# Patient Record
Sex: MALE | Race: WHITE | NOT HISPANIC OR LATINO | Employment: OTHER | URBAN - METROPOLITAN AREA
[De-identification: names, ages, dates, MRNs, and addresses within clinical notes are randomized per-mention and may not be internally consistent; named-entity substitution may affect disease eponyms.]

---

## 2019-05-06 LAB
ABS NRBC COUNT: 0 X 10 3/UL (ref 0–0.01)
ABSOLUTE BASOPHIL: 0.05 X 10 3/UL (ref 0–0.22)
ABSOLUTE EOSINOPHIL: 0.27 X 10 3/UL (ref 0.04–0.54)
ABSOLUTE IMMATURE GRAN: 0.04 X 10 3/UL (ref 0–0.04)
ABSOLUTE LYMPHOCYTE: 1.25 X 10 3/UL (ref 0.86–4.75)
ABSOLUTE MONOCYTE: 0.81 X 10 3/UL (ref 0.22–1.08)
ALBUMIN SERPL-MCNC: 4.5 G/DL (ref 3.5–5.2)
ALBUMIN/GLOB SERPL ELPH: 1.6 {RATIO} (ref 1–2.7)
ALP ISOS SERPL LEV INH-CCNC: 57 IU/L (ref 40–130)
ALT (SGPT): 28 U/L (ref 0–41)
AMORPH URATE CRY URNS QL MICRO: NEGATIVE
ANION GAP SERPL CALC-SCNC: 10 MMOL/L (ref 8–17)
AST SERPL-CCNC: 24 U/L (ref 0–40)
BACTERIA #/AREA URNS HPF: ABNORMAL /[HPF]
BASOPHILS NFR BLD: 0.6 %
BILIRUB UR QL STRIP: NEGATIVE
BILIRUBIN, TOTAL: 0.93 MG/DL (ref 0–1.2)
BUN/CREAT SERPL: 9.9 (ref 6–20)
CALCIUM SERPL-MCNC: 9.8 MG/DL (ref 8.6–10.2)
CARBON DIOXIDE, CO2: 28 MMOL/L (ref 22–29)
CHLORIDE: 101 MMOL/L (ref 98–107)
CLARITY UR: CLEAR
COLOR UR: YELLOW
CREAT SERPL-MCNC: 0.88 MG/DL (ref 0.7–1.2)
EOSINOPHIL NFR BLD: 3.2 %
EPITHELIAL CELLS: ABNORMAL
ESTIMATED AVERAGE GLUCOSE: 142 MG/DL
GFR ESTIMATION: 85.87
GLOBULIN: 2.9 G/DL (ref 1.5–4.5)
GLUCOSE (UA): NEGATIVE MG/DL
GLUCOSE: 181 MG/DL (ref 82–115)
HBA1C MFR BLD: 6.6 % (ref 4–6)
HCT VFR BLD AUTO: 49.4 % (ref 42–52)
HGB BLD-MCNC: 16.3 G/DL (ref 14–18)
IMMATURE GRANULOCYTES: 0.5 % (ref 0–0.5)
KETONES UR QL STRIP: NEGATIVE MG/DL
LEUKOCYTE ESTERASE UR QL STRIP: ABNORMAL
LYMPHOCYTES NFR BLD: 14.7 %
MCH RBC QN AUTO: 30.7 PG (ref 27–32)
MCHC RBC AUTO-ENTMCNC: 33 G/DL (ref 32–36)
MCV RBC AUTO: 93 FL (ref 80–94)
MONOCYTES NFR BLD: 9.5 %
MUCOUS THREADS URNS QL MICRO: ABNORMAL
MULTIPLE ORDERS: NORMAL
NEUTROPHILS ABSOLUTE COUNT: 6.11 X 10 3/UL (ref 2.15–7.56)
NEUTROPHILS NFR BLD: 71.5 %
NITRITE UR QL STRIP: NEGATIVE
NUCLEATED RED BLOOD CELLS: 0 /100 WBC (ref 0–0.2)
OCCULT BLOOD: NEGATIVE
PH, URINE: 5 (ref 5–7.5)
PLATELET # BLD AUTO: 225 X 10 3/UL (ref 135–400)
POTASSIUM: 4.8 MMOL/L (ref 3.5–5.1)
PROT SNV-MCNC: 7.4 G/DL (ref 6.4–8.3)
PROT UR QL STRIP: 30 MG/DL
RBC # BLD AUTO: 5.31 X 10 6/UL (ref 4.7–6.1)
RBC/HPF: NEGATIVE
RDW-SD: 49.4 FL (ref 37–54)
SED RATE (WESTERGREN): 2 MM/HR (ref 0–20)
SODIUM: 139 MMOL/L (ref 136–145)
SP GR UR STRIP: 1.02 (ref 1–1.03)
UREA NITROGEN (BUN): 8.7 MG/DL (ref 8–23)
UROBILINOGEN, URINE: NORMAL E.U./DL (ref 0–1)
WBC # BLD: 8.53 X 10 3/UL (ref 4.3–10.8)
WBC/HPF: ABNORMAL

## 2019-05-16 ENCOUNTER — OFFICE VISIT (OUTPATIENT)
Dept: RHEUMATOLOGY | Facility: CLINIC | Age: 69
End: 2019-05-16
Payer: MEDICARE

## 2019-05-16 VITALS
TEMPERATURE: 98 F | WEIGHT: 238 LBS | SYSTOLIC BLOOD PRESSURE: 120 MMHG | HEART RATE: 52 BPM | DIASTOLIC BLOOD PRESSURE: 70 MMHG | BODY MASS INDEX: 36.07 KG/M2 | HEIGHT: 68 IN | RESPIRATION RATE: 16 BRPM

## 2019-05-16 DIAGNOSIS — L40.50 PSORIASIS WITH ARTHROPATHY: ICD-10-CM

## 2019-05-16 DIAGNOSIS — M70.60 TROCHANTERIC BURSITIS, UNSPECIFIED LATERALITY: ICD-10-CM

## 2019-05-16 DIAGNOSIS — M96.1 LUMBAR POST-LAMINECTOMY SYNDROME: ICD-10-CM

## 2019-05-16 DIAGNOSIS — M54.17 LUMBOSACRAL RADICULOPATHY: ICD-10-CM

## 2019-05-16 DIAGNOSIS — M48.07 LUMBOSACRAL STENOSIS: ICD-10-CM

## 2019-05-16 DIAGNOSIS — M17.9 OSTEOARTHRITIS OF KNEE, UNSPECIFIED LATERALITY, UNSPECIFIED OSTEOARTHRITIS TYPE: ICD-10-CM

## 2019-05-16 DIAGNOSIS — E11.8 TYPE 2 DIABETES MELLITUS WITH COMPLICATION, UNSPECIFIED WHETHER LONG TERM INSULIN USE: Primary | ICD-10-CM

## 2019-05-16 DIAGNOSIS — M16.9 OSTEOARTHRITIS OF HIP, UNSPECIFIED LATERALITY, UNSPECIFIED OSTEOARTHRITIS TYPE: ICD-10-CM

## 2019-05-16 PROBLEM — E11.9 DIABETES MELLITUS: Status: ACTIVE | Noted: 2019-05-16

## 2019-05-16 PROCEDURE — 99214 PR OFFICE/OUTPT VISIT, EST, LEVL IV, 30-39 MIN: ICD-10-PCS | Mod: S$GLB,,, | Performed by: INTERNAL MEDICINE

## 2019-05-16 PROCEDURE — 3044F PR MOST RECENT HEMOGLOBIN A1C LEVEL <7.0%: ICD-10-PCS | Mod: CPTII,S$GLB,, | Performed by: INTERNAL MEDICINE

## 2019-05-16 PROCEDURE — 3044F HG A1C LEVEL LT 7.0%: CPT | Mod: CPTII,S$GLB,, | Performed by: INTERNAL MEDICINE

## 2019-05-16 PROCEDURE — 99214 OFFICE O/P EST MOD 30 MIN: CPT | Mod: S$GLB,,, | Performed by: INTERNAL MEDICINE

## 2019-05-16 RX ORDER — FOLIC ACID 0.4 MG
TABLET ORAL
COMMUNITY

## 2019-05-16 RX ORDER — ATORVASTATIN CALCIUM 40 MG/1
TABLET, FILM COATED ORAL
COMMUNITY

## 2019-05-16 RX ORDER — TADALAFIL 10 MG/1
TABLET ORAL
COMMUNITY

## 2019-05-16 RX ORDER — PHENOL/SODIUM PHENOLATE
AEROSOL, SPRAY (ML) MUCOUS MEMBRANE
COMMUNITY
End: 2019-08-21 | Stop reason: ALTCHOICE

## 2019-05-16 RX ORDER — METFORMIN HYDROCHLORIDE 1000 MG/1
TABLET ORAL
COMMUNITY

## 2019-05-16 RX ORDER — PRIMIDONE 50 MG/1
TABLET ORAL
COMMUNITY

## 2019-05-16 RX ORDER — DIPHENHYDRAMINE HCL 25 MG
CAPSULE ORAL
COMMUNITY

## 2019-05-16 RX ORDER — CLOPIDOGREL BISULFATE 75 MG/1
TABLET ORAL
COMMUNITY

## 2019-05-16 RX ORDER — LISINOPRIL 20 MG/1
TABLET ORAL
COMMUNITY

## 2019-05-16 RX ORDER — FUROSEMIDE 20 MG/1
TABLET ORAL
Refills: 0 | COMMUNITY
Start: 2019-05-09

## 2019-05-16 RX ORDER — METHOTREXATE 2.5 MG/1
TABLET ORAL
COMMUNITY
End: 2019-06-03 | Stop reason: SDUPTHER

## 2019-05-16 RX ORDER — FLUTICASONE PROPIONATE 50 MCG
SPRAY, SUSPENSION (ML) NASAL
COMMUNITY

## 2019-05-16 RX ORDER — TAMSULOSIN HYDROCHLORIDE 0.4 MG/1
CAPSULE ORAL
COMMUNITY

## 2019-05-16 RX ORDER — METOPROLOL TARTRATE 50 MG/1
TABLET ORAL
COMMUNITY

## 2019-05-16 RX ORDER — ASPIRIN 81 MG/1
TABLET ORAL
COMMUNITY

## 2019-05-16 RX ORDER — OXYBUTYNIN CHLORIDE 5 MG/1
TABLET ORAL
COMMUNITY

## 2019-05-16 RX ORDER — DICLOFENAC SODIUM 10 MG/G
GEL TOPICAL
COMMUNITY
End: 2019-12-06 | Stop reason: SDUPTHER

## 2019-05-16 RX ORDER — ROSUVASTATIN CALCIUM 10 MG/1
TABLET, COATED ORAL
COMMUNITY

## 2019-05-16 RX ORDER — AMLODIPINE BESYLATE 5 MG/1
TABLET ORAL
COMMUNITY

## 2019-05-16 RX ORDER — GABAPENTIN 300 MG/1
CAPSULE ORAL
COMMUNITY

## 2019-05-16 NOTE — PROGRESS NOTES
Subjective:       Patient ID: Kasi Long is a 68 y.o. male.    Chief Complaint: Follow-up (with labs)    HPI currently taking Enbrel 50 mg once a week  With mno skin injection site reaction and no increasd number of infection, Mejhotrexate 6 tabs once a week and folic acid. Skin Psoriasis doing well  Since last visit with shoulder pain at night  With no POM.. Using VBisine for dryness inn eyes and biotene for mouth        Current medications include:  Current Outpatient Medications on File Prior to Visit   Medication Sig Dispense Refill    amLODIPine (NORVASC) 5 MG tablet Once daily      aspirin (ECOTRIN) 81 MG EC tablet Asprin Ec Low Dose 81 mg tablet,delayed release   Take 1 tablet every day by oral route.      atorvastatin (LIPITOR) 40 MG tablet atorvastatin 40 mg tablet   TK 1 T PO QD      clopidogrel (PLAVIX) 75 mg tablet Once daily      diclofenac sodium (VOLTAREN) 1 % Gel Voltaren 1 % topical gel   JASON 2 GRAMS ON THE SKIN PRN      diphenhydrAMINE (BENADRYL) 25 mg capsule Allergy (diphenhydramine) 25 mg capsule   Take 2 capsules 4 times a day by oral route as needed.      etanercept (ENBREL SURECLICK) 50 mg/mL (0.98 mL) PnIj Inject once a week      fluticasone propionate (FLONASE) 50 mcg/actuation nasal spray fluticasone propionate 50 mcg/actuation nasal spray,suspension      folic acid (FOLVITE) 400 MCG tablet folic acid 400 mcg tablet   Take 5 tablets every day by oral route.      furosemide (LASIX) 20 MG tablet TK 1 TO 2 TS PO QD  0    gabapentin (NEURONTIN) 300 MG capsule 3 caps BID      lisinopril (PRINIVIL,ZESTRIL) 20 MG tablet lisinopril 20 mg tablet   Take 1 tablet once daily      metFORMIN (GLUCOPHAGE) 1000 MG tablet BID      methotrexate 2.5 MG Tab Take 6 tabs once a week on Monday      metoprolol tartrate (LOPRESSOR) 50 MG tablet BID      multivit-min-FA-lycopen-lutein (CENTRUM SILVER) 0.4-300-250 mg-mcg-mcg Tab Centrum Silver   Take 1 tablet by mouth once daily      omega  3-dha-epa-fish oil (FISH OIL) 100-160-1,000 mg Cap Fish Oil   Take 1 capsule by mouth once daily      omeprazole 20 mg TbEC omeprazole 20 mg capsule,delayed release   Take 1 capsule every day by oral route.      oxybutynin (DITROPAN) 5 MG Tab 1 tab BID      primidone (MYSOLINE) 50 MG Tab 1 tab once daily      rosuvastatin (CRESTOR) 10 MG tablet 1 tab once daily at bedtime      tadalafil (CIALIS) 10 MG tablet Cialis 10 mg tablet   Take 1 tablet as needed by oral route.      tamsulosin (FLOMAX) 0.4 mg Cap Once daily       No current facility-administered medications on file prior to visit.        Lab Results:  WBC   Date Value Ref Range Status   05/06/2019 8.53 4.3 - 10.8 X 10 3/ul Final     Hemoglobin   Date Value Ref Range Status   05/06/2019 16.3 14 - 18 g/dL Final     Hematocrit   Date Value Ref Range Status   05/06/2019 49.4 42 - 52 % Final     Color, UA   Date Value Ref Range Status   05/06/2019 YELLOW  Final     Ketones, UA   Date Value Ref Range Status   05/06/2019 NEGATIVE NEGATIVE mg/dL Final     Sodium   Date Value Ref Range Status   05/06/2019 139 136 - 145 mmol/L Final     Potassium   Date Value Ref Range Status   05/06/2019 4.8 3.5 - 5.1 mmol/L Final     Chloride   Date Value Ref Range Status   05/06/2019 101 98 - 107 mmol/L Final     CO2   Date Value Ref Range Status   05/06/2019 28 22 - 29 mmol/L Final     BUN, Bld   Date Value Ref Range Status   05/06/2019 8.7 8 - 23 mg/dL Final     Creatinine   Date Value Ref Range Status   05/06/2019 0.88 0.70 - 1.20 mg/dL Final        Review of Systems   Constitutional: Positive for chills and fever.        Flu   HENT:        Dryness in eyes and mouth   Eyes: Negative.    Respiratory: Positive for cough.    Cardiovascular: Negative.    Gastrointestinal:        Constipation   Endocrine:        AODM on metformin   Genitourinary: Positive for frequency.   Musculoskeletal: Positive for arthralgias and back pain.   Allergic/Immunologic: Positive for environmental  "allergies.   Neurological: Positive for numbness.        Peripheral neuropathy   Hematological: Negative.    Psychiatric/Behavioral: Negative.          Objective:   /70 (BP Location: Right arm, Patient Position: Sitting, BP Method: Large (Manual))   Pulse (!) 52   Temp 97.8 °F (36.6 °C) (Temporal)   Resp 16   Ht 5' 7.5" (1.715 m)   Wt 108 kg (238 lb)   BMI 36.73 kg/m²      Physical Exam   Constitutional: He is well-developed, well-nourished, and in no distress.   HENT:   drynessinn eyes andm mouth   Eyes: Conjunctivae and EOM are normal.   Neck: Normal range of motion. Neck supple.   Cardiovascular: Normal rate, regular rhythm and normal heart sounds.    Pulmonary/Chest: Effort normal and breath sounds normal.   Abdominal: Soft. Bowel sounds are normal.   Neurological:   SLR=mildly positive    Skin: Skin is warm and dry.     Musculoskeletal:   Hands with mild cool finger mcp synovial thickening, shoulder danika minimal tenderness ankles with achiness           Assessment:       1. Psoriasis with arthropathy    2. Lumbar post-laminectomy syndrome    3. Lumbosacral radiculopathy    4. Secondary peripheral neuropathy    5. Lumbosacral stenosis    6. Osteoarthritis of knee, unspecified laterality, unspecified osteoarthritis type    7. Osteoarthritis of hip, unspecified laterality, unspecified osteoarthritis type    8. Trochanteric bursitis, unspecified laterality    9. Type 2 diabetes mellitus with complication, unspecified whether long term insulin use            Plan:        continue current medications and dosis     "

## 2019-05-30 DIAGNOSIS — L40.50 PSORIATIC ARTHROPATHY: Primary | ICD-10-CM

## 2019-05-30 RX ORDER — METHOTREXATE 2.5 MG/1
TABLET ORAL
Qty: 24 TABLET | Refills: 1 | OUTPATIENT
Start: 2019-05-30

## 2019-06-03 RX ORDER — METHOTREXATE 2.5 MG/1
15 TABLET ORAL
Qty: 24 TABLET | Refills: 1 | Status: SHIPPED | OUTPATIENT
Start: 2019-06-03 | End: 2019-06-07 | Stop reason: SDUPTHER

## 2019-06-10 DIAGNOSIS — L40.50 PSORIASIS WITH ARTHROPATHY: Primary | ICD-10-CM

## 2019-06-10 RX ORDER — METHOTREXATE 2.5 MG/1
TABLET ORAL
Qty: 72 TABLET | Refills: 3 | Status: SHIPPED | OUTPATIENT
Start: 2019-06-10 | End: 2019-06-10 | Stop reason: SDUPTHER

## 2019-06-10 RX ORDER — METHOTREXATE 2.5 MG/1
TABLET ORAL
Qty: 72 TABLET | Refills: 3 | Status: SHIPPED | OUTPATIENT
Start: 2019-06-10 | End: 2020-03-23

## 2019-08-09 LAB
ABS NRBC COUNT: 0 X 10 3/UL (ref 0–0.01)
ABSOLUTE BASOPHIL: 0.04 X 10 3/UL (ref 0–0.22)
ABSOLUTE EOSINOPHIL: 0.24 X 10 3/UL (ref 0.04–0.54)
ABSOLUTE IMMATURE GRAN: 0.02 X 10 3/UL (ref 0–0.04)
ABSOLUTE LYMPHOCYTE: 1.64 X 10 3/UL (ref 0.86–4.75)
ABSOLUTE MONOCYTE: 0.62 X 10 3/UL (ref 0.22–1.08)
ALBUMIN SERPL-MCNC: 4.2 G/DL (ref 3.5–5.2)
ALBUMIN/GLOB SERPL ELPH: 1.7 {RATIO} (ref 1–2.7)
ALP ISOS SERPL LEV INH-CCNC: 51 IU/L (ref 40–130)
ALT (SGPT): 26 U/L (ref 0–41)
AMORPH URATE CRY URNS QL MICRO: ABNORMAL
ANION GAP SERPL CALC-SCNC: 9 MMOL/L (ref 8–17)
AST SERPL-CCNC: 18 U/L (ref 0–40)
BACTERIA #/AREA URNS HPF: ABNORMAL /[HPF]
BASOPHILS NFR BLD: 0.5 %
BILIRUB UR QL STRIP: NEGATIVE
BILIRUBIN, TOTAL: 0.54 MG/DL (ref 0–1.2)
BUN/CREAT SERPL: 10.1 (ref 6–20)
CALCIUM SERPL-MCNC: 9.4 MG/DL (ref 8.6–10.2)
CARBON DIOXIDE, CO2: 32 MMOL/L (ref 22–29)
CHLORIDE: 100 MMOL/L (ref 98–107)
CLARITY UR: ABNORMAL
COLOR UR: YELLOW
CREAT SERPL-MCNC: 0.81 MG/DL (ref 0.7–1.2)
EOSINOPHIL NFR BLD: 3.2 %
EPITHELIAL CELLS: ABNORMAL
GFR ESTIMATION: 94.48
GLOBULIN: 2.5 G/DL (ref 1.5–4.5)
GLUCOSE (UA): NEGATIVE MG/DL
GLUCOSE: 137 MG/DL (ref 82–115)
GRAN CASTS #/AREA URNS LPF: ABNORMAL /[LPF]
HCT VFR BLD AUTO: 45 % (ref 42–52)
HGB BLD-MCNC: 15.3 G/DL (ref 14–18)
IMMATURE GRANULOCYTES: 0.3 % (ref 0–0.5)
KETONES UR QL STRIP: NEGATIVE MG/DL
LEUKOCYTE ESTERASE UR QL STRIP: NEGATIVE
LYMPHOCYTES NFR BLD: 22.2 %
MCH RBC QN AUTO: 32.1 PG (ref 27–32)
MCHC RBC AUTO-ENTMCNC: 34 G/DL (ref 32–36)
MCV RBC AUTO: 94.3 FL (ref 80–94)
MONOCYTES NFR BLD: 8.4 %
MUCOUS THREADS URNS QL MICRO: ABNORMAL
NEUTROPHILS ABSOLUTE COUNT: 4.83 X 10 3/UL (ref 2.15–7.56)
NEUTROPHILS NFR BLD: 65.4 %
NITRITE UR QL STRIP: NEGATIVE
NUCLEATED RED BLOOD CELLS: 0 /100 WBC (ref 0–0.2)
OCCULT BLOOD: NEGATIVE
PH, URINE: 7 (ref 5–7.5)
PLATELET # BLD AUTO: 226 X 10 3/UL (ref 135–400)
POTASSIUM: 5.1 MMOL/L (ref 3.5–5.1)
PROT SNV-MCNC: 6.7 G/DL (ref 6.4–8.3)
PROT UR QL STRIP: NEGATIVE MG/DL
RBC # BLD AUTO: 4.77 X 10 6/UL (ref 4.7–6.1)
RBC/HPF: NEGATIVE
RDW-SD: 44.6 FL (ref 37–54)
SED RATE (WESTERGREN): <1 MM/HR (ref 0–20)
SODIUM: 141 MMOL/L (ref 136–145)
SP GR UR STRIP: 1.01 (ref 1–1.03)
UREA NITROGEN (BUN): 8.2 MG/DL (ref 8–23)
UROBILINOGEN, URINE: NORMAL E.U./DL (ref 0–1)
WBC # BLD: 7.39 X 10 3/UL (ref 4.3–10.8)
WBC/HPF: NEGATIVE

## 2019-08-21 ENCOUNTER — OFFICE VISIT (OUTPATIENT)
Dept: RHEUMATOLOGY | Facility: CLINIC | Age: 69
End: 2019-08-21
Payer: MEDICARE

## 2019-08-21 VITALS
RESPIRATION RATE: 16 BRPM | WEIGHT: 234 LBS | SYSTOLIC BLOOD PRESSURE: 140 MMHG | TEMPERATURE: 98 F | HEIGHT: 67 IN | HEART RATE: 66 BPM | DIASTOLIC BLOOD PRESSURE: 80 MMHG | BODY MASS INDEX: 36.73 KG/M2

## 2019-08-21 DIAGNOSIS — Z79.899 HISTORY OF ONGOING TREATMENT WITH HIGH-RISK MEDICATION: Primary | ICD-10-CM

## 2019-08-21 DIAGNOSIS — L40.50 PSORIASIS WITH ARTHROPATHY: ICD-10-CM

## 2019-08-21 DIAGNOSIS — E11.8 TYPE 2 DIABETES MELLITUS WITH COMPLICATION, UNSPECIFIED WHETHER LONG TERM INSULIN USE: ICD-10-CM

## 2019-08-21 DIAGNOSIS — M96.1 LUMBAR POST-LAMINECTOMY SYNDROME: ICD-10-CM

## 2019-08-21 DIAGNOSIS — M54.17 LUMBOSACRAL RADICULOPATHY: ICD-10-CM

## 2019-08-21 DIAGNOSIS — M17.9 OSTEOARTHRITIS OF KNEE, UNSPECIFIED LATERALITY, UNSPECIFIED OSTEOARTHRITIS TYPE: ICD-10-CM

## 2019-08-21 DIAGNOSIS — M48.07 LUMBOSACRAL STENOSIS: ICD-10-CM

## 2019-08-21 DIAGNOSIS — M16.9 OSTEOARTHRITIS OF HIP, UNSPECIFIED LATERALITY, UNSPECIFIED OSTEOARTHRITIS TYPE: ICD-10-CM

## 2019-08-21 PROBLEM — M12.9 ARTHROPATHY: Status: ACTIVE | Noted: 2019-08-21

## 2019-08-21 PROCEDURE — 3044F HG A1C LEVEL LT 7.0%: CPT | Mod: CPTII,S$GLB,, | Performed by: INTERNAL MEDICINE

## 2019-08-21 PROCEDURE — 99499 RISK ADDL DX/OHS AUDIT: ICD-10-PCS | Mod: S$GLB,,, | Performed by: INTERNAL MEDICINE

## 2019-08-21 PROCEDURE — 99499 UNLISTED E&M SERVICE: CPT | Mod: S$GLB,,, | Performed by: INTERNAL MEDICINE

## 2019-08-21 PROCEDURE — 99214 PR OFFICE/OUTPT VISIT, EST, LEVL IV, 30-39 MIN: ICD-10-PCS | Mod: S$GLB,,, | Performed by: INTERNAL MEDICINE

## 2019-08-21 PROCEDURE — 3044F PR MOST RECENT HEMOGLOBIN A1C LEVEL <7.0%: ICD-10-PCS | Mod: CPTII,S$GLB,, | Performed by: INTERNAL MEDICINE

## 2019-08-21 PROCEDURE — 99214 OFFICE O/P EST MOD 30 MIN: CPT | Mod: S$GLB,,, | Performed by: INTERNAL MEDICINE

## 2019-08-21 RX ORDER — PANTOPRAZOLE SODIUM 40 MG/1
TABLET, DELAYED RELEASE ORAL
Refills: 3 | COMMUNITY
Start: 2019-07-29

## 2019-08-21 NOTE — PROGRESS NOTES
Subjective:       Patient ID: Kasi Long is a 68 y.o. male.    Chief Complaint: Follow-up (with labs)    HPI continue on Enbrel 6 tabs a day, folic acid, methotrexate 6 tabs a week with skin Psoriasis limited to elbows. Since last visit with ankle pain and resolve for a couple of days. Has had no skin injection site reaction or increased number of infection . Doing well for lower back and knees using gabapentin 300 mg  Bid and peripheral neuropathy  Manifested as cold feet.        Current medications include:  Current Outpatient Medications on File Prior to Visit   Medication Sig Dispense Refill    amLODIPine (NORVASC) 5 MG tablet Once daily      aspirin (ECOTRIN) 81 MG EC tablet Asprin Ec Low Dose 81 mg tablet,delayed release   Take 1 tablet every day by oral route.      atorvastatin (LIPITOR) 40 MG tablet atorvastatin 40 mg tablet   TK 1 T PO QD      clopidogrel (PLAVIX) 75 mg tablet Once daily      diclofenac sodium (VOLTAREN) 1 % Gel Voltaren 1 % topical gel   JASON 2 GRAMS ON THE SKIN PRN      diphenhydrAMINE (BENADRYL) 25 mg capsule Allergy (diphenhydramine) 25 mg capsule   Take 2 capsules 4 times a day by oral route as needed.      etanercept (ENBREL SURECLICK) 50 mg/mL (0.98 mL) PnIj Inject once a week      fluticasone propionate (FLONASE) 50 mcg/actuation nasal spray fluticasone propionate 50 mcg/actuation nasal spray,suspension      folic acid (FOLVITE) 400 MCG tablet folic acid 400 mcg tablet   Take 5 tablets every day by oral route.      furosemide (LASIX) 20 MG tablet TK 1 TO 2 TS PO QD  0    gabapentin (NEURONTIN) 300 MG capsule 3 caps BID      lisinopril (PRINIVIL,ZESTRIL) 20 MG tablet lisinopril 20 mg tablet   Take 1 tablet once daily      metFORMIN (GLUCOPHAGE) 1000 MG tablet BID      methotrexate 2.5 MG Tab TAKE 6 TABLETS ONE TIME WEEKLY  ON  MONDAY 72 tablet 3    metoprolol tartrate (LOPRESSOR) 50 MG tablet BID      multivit-min-FA-lycopen-lutein (CENTRUM SILVER) 0.4-300-250  mg-mcg-mcg Tab Centrum Silver   Take 1 tablet by mouth once daily      omega 3-dha-epa-fish oil (FISH OIL) 100-160-1,000 mg Cap Fish Oil   Take 1 capsule by mouth once daily      oxybutynin (DITROPAN) 5 MG Tab 1 tab BID      pantoprazole (PROTONIX) 40 MG tablet TK 1 T PO QD  3    primidone (MYSOLINE) 50 MG Tab 1 tab once daily      rosuvastatin (CRESTOR) 10 MG tablet 1 tab once daily at bedtime      tadalafil (CIALIS) 10 MG tablet Cialis 10 mg tablet   Take 1 tablet as needed by oral route.      tamsulosin (FLOMAX) 0.4 mg Cap Once daily      [DISCONTINUED] omeprazole 20 mg TbEC omeprazole 20 mg capsule,delayed release   Take 1 capsule every day by oral route.       No current facility-administered medications on file prior to visit.        Lab Results:  WBC   Date Value Ref Range Status   08/09/2019 7.39 4.3 - 10.8 X 10 3/ul Final     Hemoglobin   Date Value Ref Range Status   08/09/2019 15.3 14 - 18 g/dL Final     Hematocrit   Date Value Ref Range Status   08/09/2019 45.0 42 - 52 % Final     Color, UA   Date Value Ref Range Status   08/09/2019 YELLOW  Final     Ketones, UA   Date Value Ref Range Status   08/09/2019 NEGATIVE NEGATIVE mg/dL Final     Sodium   Date Value Ref Range Status   08/09/2019 141 136 - 145 mmol/L Final     Potassium   Date Value Ref Range Status   08/09/2019 5.1 3.5 - 5.1 mmol/L Final     Chloride   Date Value Ref Range Status   08/09/2019 100 98 - 107 mmol/L Final     CO2   Date Value Ref Range Status   08/09/2019 32 (H) 22 - 29 mmol/L Final     BUN, Bld   Date Value Ref Range Status   08/09/2019 8.2 8 - 23 mg/dL Final     Creatinine   Date Value Ref Range Status   08/09/2019 0.81 0.70 - 1.20 mg/dL Final        Review of Systems   Constitutional:        Has lost 10 lbs on a diet   HENT:        Mouth dryness   Eyes: Negative.    Respiratory: Negative.    Cardiovascular: Negative.    Gastrointestinal: Negative.    Endocrine:        DM on metformin    Genitourinary:        Prostatism  "  Musculoskeletal: Positive for arthralgias and back pain.   Allergic/Immunologic: Positive for environmental allergies.   Neurological:        Feet with numbness   Hematological: Negative.    Psychiatric/Behavioral: Negative.          Objective:   BP (!) 140/80 (BP Location: Right arm, Patient Position: Sitting, BP Method: Large (Manual))   Pulse 66   Temp 97.7 °F (36.5 °C) (Temporal)   Resp 16   Ht 5' 7" (1.702 m)   Wt 106.1 kg (234 lb)   BMI 36.65 kg/m²      Physical Exam   Constitutional: He is well-developed, well-nourished, and in no distress.   HENT:   Head: Atraumatic.   No dryness in eyes and mouth   Eyes: Conjunctivae and EOM are normal. Pupils are equal, round, and reactive to light.   Neck: Normal range of motion. Neck supple.   Cardiovascular: Normal rate and regular rhythm.    Pulmonary/Chest: Effort normal and breath sounds normal.   Abdominal: Soft. Bowel sounds are normal.   Neurological:   SLR=positive bilaterally   Skin:     Elbows with Psoriatic plaques.   Musculoskeletal:   Mild finger PIP hyperextensioin. Left shoulder POM and left hip pain on ROM           Assessment:       1. Psoriasis with arthropathy    2. Lumbar post-laminectomy syndrome    3. Osteoarthritis of knee, unspecified laterality, unspecified osteoarthritis type    4. Type 2 diabetes mellitus with complication, unspecified whether long term insulin use    5. Lumbosacral stenosis    6. Lumbosacral radiculopathy    7. Osteoarthritis of hip, unspecified laterality, unspecified osteoarthritis type            Plan:       Doing well and will continue on current medication  And dosis      "

## 2019-08-23 NOTE — PROGRESS NOTES
Patient, Kasi Long (MRN #[id number]), presented with a recorded BMI of 36.65 kg/m^2 and a documented comorbidity(s):  - Diabetes Mellitus Type 2  to which the severe obesity is a contributing factor. This is consistent with the definition of severe obesity (BMI 35.0-39.9) with comorbidity (ICD-10 E66.01, Z68.35). The patient's severe obesity was monitored, evaluated, addressed and/or treated. This addendum to the medical record is made on 08/23/2019.

## 2019-11-27 ENCOUNTER — OFFICE VISIT (OUTPATIENT)
Dept: RHEUMATOLOGY | Facility: CLINIC | Age: 69
End: 2019-11-27
Payer: MEDICARE

## 2019-11-27 VITALS
DIASTOLIC BLOOD PRESSURE: 80 MMHG | BODY MASS INDEX: 36.29 KG/M2 | SYSTOLIC BLOOD PRESSURE: 132 MMHG | RESPIRATION RATE: 16 BRPM | WEIGHT: 231.19 LBS | OXYGEN SATURATION: 96 % | TEMPERATURE: 98 F | HEART RATE: 74 BPM | HEIGHT: 67 IN

## 2019-11-27 DIAGNOSIS — M48.07 LUMBOSACRAL STENOSIS: ICD-10-CM

## 2019-11-27 DIAGNOSIS — L40.50 PSORIASIS WITH ARTHROPATHY: Primary | ICD-10-CM

## 2019-11-27 DIAGNOSIS — Z79.899 HISTORY OF ONGOING TREATMENT WITH HIGH-RISK MEDICATION: ICD-10-CM

## 2019-11-27 DIAGNOSIS — M96.1 LUMBAR POST-LAMINECTOMY SYNDROME: ICD-10-CM

## 2019-11-27 DIAGNOSIS — M70.60 TROCHANTERIC BURSITIS, UNSPECIFIED LATERALITY: ICD-10-CM

## 2019-11-27 DIAGNOSIS — M54.17 LUMBOSACRAL RADICULOPATHY: ICD-10-CM

## 2019-11-27 DIAGNOSIS — I63.9 CEREBROVASCULAR ACCIDENT (CVA), UNSPECIFIED MECHANISM: ICD-10-CM

## 2019-11-27 DIAGNOSIS — M17.9 OSTEOARTHRITIS OF KNEE, UNSPECIFIED LATERALITY, UNSPECIFIED OSTEOARTHRITIS TYPE: ICD-10-CM

## 2019-11-27 DIAGNOSIS — M16.9 OSTEOARTHRITIS OF HIP, UNSPECIFIED LATERALITY, UNSPECIFIED OSTEOARTHRITIS TYPE: ICD-10-CM

## 2019-11-27 PROCEDURE — 1159F MED LIST DOCD IN RCRD: CPT | Mod: S$GLB,,, | Performed by: INTERNAL MEDICINE

## 2019-11-27 PROCEDURE — 99214 OFFICE O/P EST MOD 30 MIN: CPT | Mod: S$GLB,,, | Performed by: INTERNAL MEDICINE

## 2019-11-27 PROCEDURE — 99214 PR OFFICE/OUTPT VISIT, EST, LEVL IV, 30-39 MIN: ICD-10-PCS | Mod: S$GLB,,, | Performed by: INTERNAL MEDICINE

## 2019-11-27 PROCEDURE — 1159F PR MEDICATION LIST DOCUMENTED IN MEDICAL RECORD: ICD-10-PCS | Mod: S$GLB,,, | Performed by: INTERNAL MEDICINE

## 2019-11-27 NOTE — PROGRESS NOTES
Subjective:       Patient ID: Kasi Long is a 69 y.o. male.    Chief Complaint: Follow-up    HPI  Continue on Enbrel 50 mg once a week, Methotrexate 6 tabs a week  And folic acid an Gabapentin 600 mg 3 caps BID.Skin Psoriasis  With flare ups on and off clear using Dovonex . No acutelly swollen tender painful inflammed joint. . No Enbrel injection site reactio or increased number . Peripjheral neuropathy stable helped by Gabapentin. Had a CVA treated with TPA  With no residual deficit and  Carotid rt 90 % blocked and had endarterectomy. 1 month before CVA  Current medications include:  Current Outpatient Medications on File Prior to Visit   Medication Sig Dispense Refill    aspirin (ECOTRIN) 81 MG EC tablet Asprin Ec Low Dose 81 mg tablet,delayed release   Take 1 tablet every day by oral route.      clopidogrel (PLAVIX) 75 mg tablet Once daily      diclofenac sodium (VOLTAREN) 1 % Gel Voltaren 1 % topical gel   JASON 2 GRAMS ON THE SKIN PRN      fluticasone propionate (FLONASE) 50 mcg/actuation nasal spray fluticasone propionate 50 mcg/actuation nasal spray,suspension      folic acid (FOLVITE) 400 MCG tablet folic acid 400 mcg tablet   Take 5 tablets every day by oral route.      furosemide (LASIX) 20 MG tablet TK 1 TO 2 TS PO QD  0    gabapentin (NEURONTIN) 300 MG capsule 3 caps BID      lisinopril (PRINIVIL,ZESTRIL) 20 MG tablet lisinopril 20 mg tablet   Take 1 tablet once daily      metFORMIN (GLUCOPHAGE) 1000 MG tablet BID      methotrexate 2.5 MG Tab TAKE 6 TABLETS ONE TIME WEEKLY  ON  MONDAY 72 tablet 3    multivit-min-FA-lycopen-lutein (CENTRUM SILVER) 0.4-300-250 mg-mcg-mcg Tab Centrum Silver   Take 1 tablet by mouth once daily      oxybutynin (DITROPAN) 5 MG Tab 1 tab BID      pantoprazole (PROTONIX) 40 MG tablet TK 1 T PO QD  3    primidone (MYSOLINE) 50 MG Tab 1 tab once daily      rosuvastatin (CRESTOR) 10 MG tablet 1 tab once daily at bedtime      tadalafil (CIALIS) 10 MG tablet Cialis  10 mg tablet   Take 1 tablet as needed by oral route.      tamsulosin (FLOMAX) 0.4 mg Cap Once daily      amLODIPine (NORVASC) 5 MG tablet Once daily      atorvastatin (LIPITOR) 40 MG tablet atorvastatin 40 mg tablet   TK 1 T PO QD      diphenhydrAMINE (BENADRYL) 25 mg capsule Allergy (diphenhydramine) 25 mg capsule   Take 2 capsules 4 times a day by oral route as needed.      etanercept (ENBREL SURECLICK) 50 mg/mL (0.98 mL) PnIj Inject once a week      metoprolol tartrate (LOPRESSOR) 50 MG tablet BID      omega 3-dha-epa-fish oil (FISH OIL) 100-160-1,000 mg Cap Fish Oil   Take 1 capsule by mouth once daily       No current facility-administered medications on file prior to visit.        Lab Results:  WBC   Date Value Ref Range Status   08/09/2019 7.39 4.3 - 10.8 X 10 3/ul Final     Hemoglobin   Date Value Ref Range Status   08/09/2019 15.3 14 - 18 g/dL Final     Hematocrit   Date Value Ref Range Status   08/09/2019 45.0 42 - 52 % Final     Color, UA   Date Value Ref Range Status   08/09/2019 YELLOW  Final     Ketones, UA   Date Value Ref Range Status   08/09/2019 NEGATIVE NEGATIVE mg/dL Final     Sodium   Date Value Ref Range Status   08/09/2019 141 136 - 145 mmol/L Final     Potassium   Date Value Ref Range Status   08/09/2019 5.1 3.5 - 5.1 mmol/L Final     Chloride   Date Value Ref Range Status   08/09/2019 100 98 - 107 mmol/L Final     CO2   Date Value Ref Range Status   08/09/2019 32 (H) 22 - 29 mmol/L Final     BUN, Bld   Date Value Ref Range Status   08/09/2019 8.2 8 - 23 mg/dL Final     Creatinine   Date Value Ref Range Status   08/09/2019 0.81 0.70 - 1.20 mg/dL Final        Review of Systems   Constitutional: Negative.    HENT:        Dryness in eyes on artificial  Tear drops.   Eyes: Negative.    Respiratory: Negative.    Cardiovascular: Negative.    Gastrointestinal: Positive for constipation.   Endocrine:        DM type 2 on metformin 2000 mg a day   Genitourinary: Negative.    Musculoskeletal:  "Positive for arthralgias and back pain.   Allergic/Immunologic: Positive for environmental allergies.   Neurological: Negative.    Hematological: Negative.    Psychiatric/Behavioral: Negative.          Objective:   /80 (BP Location: Right arm, Patient Position: Sitting, BP Method: Small (Manual))   Pulse 74   Temp 98.1 °F (36.7 °C) (Oral)   Resp 16   Ht 5' 7" (1.702 m)   Wt 104.9 kg (231 lb 3.2 oz)   SpO2 96%   BMI 36.21 kg/m²      Physical Exam   Constitutional: He is well-developed, well-nourished, and in no distress.   HENT:   Dryness in mouth   Eyes: Conjunctivae are normal. Pupils are equal, round, and reactive to light.   Neck: Normal range of motion. Neck supple.   Cardiovascular: Normal rate, regular rhythm and normal heart sounds.    Pulmonary/Chest: Breath sounds normal.   Abdominal: Soft. Bowel sounds are normal.   Neurological:   SLR=positive rt> left   Skin: Skin is warm and dry.     Skin psoriasis plaques in elbow   Psychiatric: Mood, memory, affect and judgment normal.   Musculoskeletal:   No synovial tjhickening in small joints in hands , no tenderness in sjhoulders , lower back, rt hip greater trochanter bursae tenderness.            Assessment:       1. Psoriasis with arthropathy    2. Osteoarthritis of knee, unspecified laterality, unspecified osteoarthritis type    3. Osteoarthritis of hip, unspecified laterality, unspecified osteoarthritis type    4. Lumbar post-laminectomy syndrome    5. Lumbosacral radiculopathy    6. Lumbosacral stenosis    7. Secondary peripheral neuropathy    8. Trochanteric bursitis, unspecified laterality    9. Cerebrovascular accident (CVA), unspecified mechanism            Plan:       Continue with current medications and dosis. Psoriatic arthriti controlle on current treatment     "

## 2019-12-06 DIAGNOSIS — M17.9 OSTEOARTHRITIS OF KNEE, UNSPECIFIED LATERALITY, UNSPECIFIED OSTEOARTHRITIS TYPE: Primary | ICD-10-CM

## 2019-12-09 RX ORDER — DICLOFENAC SODIUM 10 MG/G
GEL TOPICAL
Qty: 1 TUBE | Refills: 2 | Status: SHIPPED | OUTPATIENT
Start: 2019-12-09

## 2020-02-26 ENCOUNTER — OFFICE VISIT (OUTPATIENT)
Dept: RHEUMATOLOGY | Facility: CLINIC | Age: 70
End: 2020-02-26
Payer: MEDICARE

## 2020-02-26 VITALS
WEIGHT: 232 LBS | HEART RATE: 84 BPM | RESPIRATION RATE: 16 BRPM | HEIGHT: 67 IN | SYSTOLIC BLOOD PRESSURE: 129 MMHG | BODY MASS INDEX: 36.41 KG/M2 | OXYGEN SATURATION: 97 % | TEMPERATURE: 97 F | DIASTOLIC BLOOD PRESSURE: 74 MMHG

## 2020-02-26 DIAGNOSIS — M17.9 OSTEOARTHRITIS OF KNEE, UNSPECIFIED LATERALITY, UNSPECIFIED OSTEOARTHRITIS TYPE: ICD-10-CM

## 2020-02-26 DIAGNOSIS — M96.1 LUMBAR POST-LAMINECTOMY SYNDROME: ICD-10-CM

## 2020-02-26 DIAGNOSIS — L40.50 PSORIASIS WITH ARTHROPATHY: Primary | ICD-10-CM

## 2020-02-26 DIAGNOSIS — Z79.899 HISTORY OF ONGOING TREATMENT WITH HIGH-RISK MEDICATION: ICD-10-CM

## 2020-02-26 DIAGNOSIS — M54.17 LUMBOSACRAL RADICULOPATHY: ICD-10-CM

## 2020-02-26 DIAGNOSIS — M70.60 TROCHANTERIC BURSITIS, UNSPECIFIED LATERALITY: ICD-10-CM

## 2020-02-26 DIAGNOSIS — E11.9 TYPE 2 DIABETES MELLITUS WITHOUT COMPLICATION, UNSPECIFIED WHETHER LONG TERM INSULIN USE: ICD-10-CM

## 2020-02-26 DIAGNOSIS — M48.07 LUMBOSACRAL STENOSIS: ICD-10-CM

## 2020-02-26 DIAGNOSIS — M16.9 OSTEOARTHRITIS OF HIP, UNSPECIFIED LATERALITY, UNSPECIFIED OSTEOARTHRITIS TYPE: ICD-10-CM

## 2020-02-26 PROCEDURE — 99499 RISK ADDL DX/OHS AUDIT: ICD-10-PCS | Mod: S$GLB,,, | Performed by: INTERNAL MEDICINE

## 2020-02-26 PROCEDURE — 99499 UNLISTED E&M SERVICE: CPT | Mod: S$GLB,,, | Performed by: INTERNAL MEDICINE

## 2020-02-26 PROCEDURE — 99214 PR OFFICE/OUTPT VISIT, EST, LEVL IV, 30-39 MIN: ICD-10-PCS | Mod: S$GLB,,, | Performed by: INTERNAL MEDICINE

## 2020-02-26 PROCEDURE — 1159F MED LIST DOCD IN RCRD: CPT | Mod: S$GLB,,, | Performed by: INTERNAL MEDICINE

## 2020-02-26 PROCEDURE — 99214 OFFICE O/P EST MOD 30 MIN: CPT | Mod: S$GLB,,, | Performed by: INTERNAL MEDICINE

## 2020-02-26 PROCEDURE — 1159F PR MEDICATION LIST DOCUMENTED IN MEDICAL RECORD: ICD-10-PCS | Mod: S$GLB,,, | Performed by: INTERNAL MEDICINE

## 2020-02-26 PROCEDURE — 3044F HG A1C LEVEL LT 7.0%: CPT | Mod: CPTII,S$GLB,, | Performed by: INTERNAL MEDICINE

## 2020-02-26 PROCEDURE — 3044F PR MOST RECENT HEMOGLOBIN A1C LEVEL <7.0%: ICD-10-PCS | Mod: CPTII,S$GLB,, | Performed by: INTERNAL MEDICINE

## 2020-02-26 NOTE — PROGRESS NOTES
Patient, Kasi Long (MRN #[id number]), presented with a recorded BMI of 36.34 kg/m^2 and a documented comorbidity(s):  - Diabetes Mellitus Type 2  to which the severe obesity is a contributing factor. This is consistent with the definition of severe obesity (BMI 35.0-39.9) with comorbidity (ICD-10 E66.01, Z68.35). The patient's severe obesity was monitored, evaluated, addressed and/or treated. This addendum to the medical record is made on 02/26/2020.

## 2020-02-26 NOTE — PROGRESS NOTES
Subjective:       Patient ID: Kasi Long is a 69 y.o. male.    Chief Complaint: Follow-up (with lab results)    HPI Continue taking Enbrel 50 mg a daym mno skinn injectio site sede eefctes and no increased number of infection, methtrexate   6 tabs a week  And folic acid . Psoriasis doing well  . Left shoulder with achiness  No acutelysweollen tender painful joint,.Lower back asymptomatic      Current medications include:  Current Outpatient Medications on File Prior to Visit   Medication Sig Dispense Refill    amLODIPine (NORVASC) 5 MG tablet Once daily      aspirin (ECOTRIN) 81 MG EC tablet Asprin Ec Low Dose 81 mg tablet,delayed release   Take 1 tablet every day by oral route.      atorvastatin (LIPITOR) 40 MG tablet atorvastatin 40 mg tablet   TK 1 T PO QD      clopidogrel (PLAVIX) 75 mg tablet Once daily      diclofenac sodium (VOLTAREN) 1 % Gel JASON 2 GRAMS ON THE SKIN PRN 1 Tube 2    diphenhydrAMINE (BENADRYL) 25 mg capsule Allergy (diphenhydramine) 25 mg capsule   Take 2 capsules 4 times a day by oral route as needed.      etanercept (ENBREL SURECLICK) 50 mg/mL (0.98 mL) PnIj Inject once a week      fluticasone propionate (FLONASE) 50 mcg/actuation nasal spray fluticasone propionate 50 mcg/actuation nasal spray,suspension      folic acid (FOLVITE) 400 MCG tablet folic acid 400 mcg tablet   Take 5 tablets every day by oral route.      furosemide (LASIX) 20 MG tablet TK 1 TO 2 TS PO QD  0    gabapentin (NEURONTIN) 300 MG capsule 3 caps BID      lisinopril (PRINIVIL,ZESTRIL) 20 MG tablet lisinopril 20 mg tablet   Take 1 tablet once daily      metFORMIN (GLUCOPHAGE) 1000 MG tablet BID      methotrexate 2.5 MG Tab TAKE 6 TABLETS ONE TIME WEEKLY  ON  MONDAY 72 tablet 3    metoprolol tartrate (LOPRESSOR) 50 MG tablet BID      multivit-min-FA-lycopen-lutein (CENTRUM SILVER) 0.4-300-250 mg-mcg-mcg Tab Centrum Silver   Take 1 tablet by mouth once daily      omega 3-dha-epa-fish oil (FISH OIL)  100-160-1,000 mg Cap Fish Oil   Take 1 capsule by mouth once daily      oxybutynin (DITROPAN) 5 MG Tab 1 tab BID      pantoprazole (PROTONIX) 40 MG tablet TK 1 T PO QD  3    primidone (MYSOLINE) 50 MG Tab 1 tab once daily      rosuvastatin (CRESTOR) 10 MG tablet 1 tab once daily at bedtime      tadalafil (CIALIS) 10 MG tablet Cialis 10 mg tablet   Take 1 tablet as needed by oral route.      tamsulosin (FLOMAX) 0.4 mg Cap Once daily       No current facility-administered medications on file prior to visit.        Lab Results:  WBC   Date Value Ref Range Status   08/09/2019 7.39 4.3 - 10.8 X 10 3/ul Final     Hemoglobin   Date Value Ref Range Status   08/09/2019 15.3 14 - 18 g/dL Final     Hematocrit   Date Value Ref Range Status   08/09/2019 45.0 42 - 52 % Final     Color, UA   Date Value Ref Range Status   08/09/2019 YELLOW  Final     Ketones, UA   Date Value Ref Range Status   08/09/2019 NEGATIVE NEGATIVE mg/dL Final     Sodium   Date Value Ref Range Status   08/09/2019 141 136 - 145 mmol/L Final     Potassium   Date Value Ref Range Status   08/09/2019 5.1 3.5 - 5.1 mmol/L Final     Chloride   Date Value Ref Range Status   08/09/2019 100 98 - 107 mmol/L Final     CO2   Date Value Ref Range Status   08/09/2019 32 (H) 22 - 29 mmol/L Final     BUN, Bld   Date Value Ref Range Status   08/09/2019 8.2 8 - 23 mg/dL Final     Creatinine   Date Value Ref Range Status   08/09/2019 0.81 0.70 - 1.20 mg/dL Final        Review of Systems   Constitutional: Negative.    HENT:        Dryness in eyes    Eyes: Negative.    Respiratory: Negative.    Cardiovascular: Negative.    Gastrointestinal: Positive for constipation.   Endocrine:        Dmm type 2    Genitourinary: Positive for difficulty urinating and frequency.   Musculoskeletal: Positive for arthralgias and back pain.   Allergic/Immunologic: Positive for environmental allergies.   Neurological:        Tingling in the feet and legs   Hematological: Negative.   "  Psychiatric/Behavioral: Negative.          Objective:   /74 (BP Location: Left arm, Patient Position: Sitting, BP Method: Large (Automatic))   Pulse 84   Temp 97 °F (36.1 °C) (Temporal)   Resp 16   Ht 5' 7" (1.702 m)   Wt 105.2 kg (232 lb)   SpO2 97%   BMI 36.34 kg/m²      Physical Exam   Constitutional: He is well-developed, well-nourished, and in no distress.   HENT:   druyness ion mouth   Eyes: Conjunctivae and EOM are normal. Pupils are equal, round, and reactive to light.   Neck: Normal range of motion. Neck supple.   Cardiovascular: Normal rate, regular rhythm and normal heart sounds.    Pulmonary/Chest: Effort normal and breath sounds normal.   Abdominal: Soft. Bowel sounds are normal.   Neurological:   SLR =negative    Skin: Skin is warm and dry.     Psychiatric: Mood, memory, affect and judgment normal.   Musculoskeletal:   Left shoulder tenderness of  The sa bursae jhip tendernes on the left side            Assessment:       1. Psoriasis with arthropathy    2. Trochanteric bursitis, unspecified laterality    3. Osteoarthritis of knee, unspecified laterality, unspecified osteoarthritis type    4. Osteoarthritis of hip, unspecified laterality, unspecified osteoarthritis type    5. Lumbar post-laminectomy syndrome    6. Type 2 diabetes mellitus without complication, unspecified whether long term insulin use    7. Secondary peripheral neuropathy    8. Lumbosacral stenosis    9. Lumbosacral radiculopathy            Plan:       Continue same medications and dosis     "

## 2020-03-21 DIAGNOSIS — L40.50 PSORIASIS WITH ARTHROPATHY: ICD-10-CM

## 2020-03-23 RX ORDER — METHOTREXATE 2.5 MG/1
TABLET ORAL
Qty: 72 TABLET | Refills: 3 | Status: SHIPPED | OUTPATIENT
Start: 2020-03-23

## 2020-05-26 ENCOUNTER — OFFICE VISIT (OUTPATIENT)
Dept: RHEUMATOLOGY | Facility: CLINIC | Age: 70
End: 2020-05-26
Payer: MEDICARE

## 2020-05-26 VITALS
BODY MASS INDEX: 35.79 KG/M2 | TEMPERATURE: 98 F | WEIGHT: 228 LBS | HEART RATE: 72 BPM | RESPIRATION RATE: 16 BRPM | SYSTOLIC BLOOD PRESSURE: 116 MMHG | DIASTOLIC BLOOD PRESSURE: 73 MMHG | HEIGHT: 67 IN

## 2020-05-26 DIAGNOSIS — M48.07 LUMBOSACRAL STENOSIS: ICD-10-CM

## 2020-05-26 DIAGNOSIS — M17.9 OSTEOARTHRITIS OF KNEE, UNSPECIFIED LATERALITY, UNSPECIFIED OSTEOARTHRITIS TYPE: ICD-10-CM

## 2020-05-26 DIAGNOSIS — M54.17 LUMBOSACRAL RADICULOPATHY: ICD-10-CM

## 2020-05-26 DIAGNOSIS — M70.60 TROCHANTERIC BURSITIS, UNSPECIFIED LATERALITY: ICD-10-CM

## 2020-05-26 DIAGNOSIS — M96.1 LUMBAR POST-LAMINECTOMY SYNDROME: ICD-10-CM

## 2020-05-26 DIAGNOSIS — L40.50 PSORIASIS WITH ARTHROPATHY: Primary | ICD-10-CM

## 2020-05-26 PROCEDURE — 99214 PR OFFICE/OUTPT VISIT, EST, LEVL IV, 30-39 MIN: ICD-10-PCS | Mod: S$GLB,,, | Performed by: INTERNAL MEDICINE

## 2020-05-26 PROCEDURE — 1159F MED LIST DOCD IN RCRD: CPT | Mod: S$GLB,,, | Performed by: INTERNAL MEDICINE

## 2020-05-26 PROCEDURE — 99214 OFFICE O/P EST MOD 30 MIN: CPT | Mod: S$GLB,,, | Performed by: INTERNAL MEDICINE

## 2020-05-26 PROCEDURE — 1159F PR MEDICATION LIST DOCUMENTED IN MEDICAL RECORD: ICD-10-PCS | Mod: S$GLB,,, | Performed by: INTERNAL MEDICINE

## 2020-05-26 NOTE — PROGRESS NOTES
Subjective:       Patient ID: Kasi Long is a 69 y.o. male.    Chief Complaint: Follow-up    HPI mContinue onn Enbrel 50 mg sq inj once a [facility] no increased number of infection or injection side reaction . Usin methotrexate 6 tabs a week and folic acid ., Gabapentin 600 mg tid . No acuteluy swollen tender painful jointLower back doing welland between shoulder Knee not symptomaticm butfeet cold, [place] doing well         Current medications include:  Current Outpatient Medications on File Prior to Visit   Medication Sig Dispense Refill    amLODIPine (NORVASC) 5 MG tablet Once daily      aspirin (ECOTRIN) 81 MG EC tablet Asprin Ec Low Dose 81 mg tablet,delayed release   Take 1 tablet every day by oral route.      atorvastatin (LIPITOR) 40 MG tablet atorvastatin 40 mg tablet   TK 1 T PO QD      clopidogrel (PLAVIX) 75 mg tablet Once daily      diclofenac sodium (VOLTAREN) 1 % Gel JASON 2 GRAMS ON THE SKIN PRN 1 Tube 2    diphenhydrAMINE (BENADRYL) 25 mg capsule Allergy (diphenhydramine) 25 mg capsule   Take 2 capsules 4 times a day by oral route as needed.      etanercept (ENBREL SURECLICK) 50 mg/mL (0.98 mL) PnIj Inject once a week      fluticasone propionate (FLONASE) 50 mcg/actuation nasal spray fluticasone propionate 50 mcg/actuation nasal spray,suspension      folic acid (FOLVITE) 400 MCG tablet folic acid 400 mcg tablet   Take 5 tablets every day by oral route.      furosemide (LASIX) 20 MG tablet TK 1 TO 2 TS PO QD  0    gabapentin (NEURONTIN) 300 MG capsule 3 caps BID      lisinopril (PRINIVIL,ZESTRIL) 20 MG tablet lisinopril 20 mg tablet   Take 1 tablet once daily      metFORMIN (GLUCOPHAGE) 1000 MG tablet BID      methotrexate 2.5 MG Tab TAKE 6 TABLETS ONE TIME WEEKLY  ON  MONDAY 72 tablet 3    metoprolol tartrate (LOPRESSOR) 50 MG tablet BID      multivit-min-FA-lycopen-lutein (CENTRUM SILVER) 0.4-300-250 mg-mcg-mcg Tab Centrum Silver   Take 1 tablet by mouth once daily      omega  3-dha-epa-fish oil (FISH OIL) 100-160-1,000 mg Cap Fish Oil   Take 1 capsule by mouth once daily      oxybutynin (DITROPAN) 5 MG Tab 1 tab BID      pantoprazole (PROTONIX) 40 MG tablet TK 1 T PO QD  3    primidone (MYSOLINE) 50 MG Tab 1 tab once daily      rosuvastatin (CRESTOR) 10 MG tablet 1 tab once daily at bedtime      tadalafil (CIALIS) 10 MG tablet Cialis 10 mg tablet   Take 1 tablet as needed by oral route.      tamsulosin (FLOMAX) 0.4 mg Cap Once daily       No current facility-administered medications on file prior to visit.        Lab Results:  WBC   Date Value Ref Range Status   08/09/2019 7.39 4.3 - 10.8 X 10 3/ul Final     Hemoglobin   Date Value Ref Range Status   08/09/2019 15.3 14 - 18 g/dL Final     Hematocrit   Date Value Ref Range Status   08/09/2019 45.0 42 - 52 % Final     Color, UA   Date Value Ref Range Status   08/09/2019 YELLOW  Final     Ketones, UA   Date Value Ref Range Status   08/09/2019 NEGATIVE NEGATIVE mg/dL Final     Sodium   Date Value Ref Range Status   08/09/2019 141 136 - 145 mmol/L Final     Potassium   Date Value Ref Range Status   08/09/2019 5.1 3.5 - 5.1 mmol/L Final     Chloride   Date Value Ref Range Status   08/09/2019 100 98 - 107 mmol/L Final     CO2   Date Value Ref Range Status   08/09/2019 32 (H) 22 - 29 mmol/L Final     BUN, Bld   Date Value Ref Range Status   08/09/2019 8.2 8 - 23 mg/dL Final     Creatinine   Date Value Ref Range Status   08/09/2019 0.81 0.70 - 1.20 mg/dL Final        Review of Systems   Constitutional: Positive for fever.        Covid 19 negative   HENT:        Dryness in eyes and mouth   Eyes: Negative.    Respiratory: Positive for cough.    Cardiovascular: Negative.    Gastrointestinal: Positive for constipation.   Endocrine:        AODM on metformin   Genitourinary: Positive for frequency.   Musculoskeletal: Positive for arthralgias and back pain.   Allergic/Immunologic: Positive for environmental allergies.   Neurological: Negative.   "  Hematological: Negative.    Psychiatric/Behavioral: Negative.          Objective:   /73 (BP Location: Left arm, Patient Position: Sitting, BP Method: Large (Automatic))   Pulse 72   Temp 97.5 °F (36.4 °C) (Temporal)   Resp 16   Ht 5' 7" (1.702 m)   Wt 103.4 kg (228 lb)   BMI 35.71 kg/m²      Physical Exam   Constitutional: He is oriented to person, place, and time and well-developed, well-nourished, and in no distress.   HENT:   Head: Normocephalic and atraumatic.   Eyes: Conjunctivae and EOM are normal. Pupils are equal, round, and reactive to light.   Neck: Normal range of motion. Neck supple.   Cardiovascular: Normal rate and regular rhythm.    Pulmonary/Chest: Effort normal and breath sounds normal.   Abdominal: Soft.   Neurological: He is alert and oriented to person, place, and time.   Skin: Skin is warm and dry.     Psychiatric: Mood, memory, affect and judgment normal.   Musculoskeletal: Normal range of motion.           Assessment:       1. Psoriasis with arthropathy    2. Trochanteric bursitis, unspecified laterality    3. Osteoarthritis of knee, unspecified laterality, unspecified osteoarthritis type    4. Lumbar post-laminectomy syndrome    5. Lumbosacral radiculopathy    6. Lumbosacral stenosis    7. Secondary peripheral neuropathy            Plan:       Will continue with same medications and dosis  For artjhitis stable     "

## 2020-06-22 DIAGNOSIS — L40.50 PSORIASIS WITH ARTHROPATHY: ICD-10-CM

## 2020-06-22 DIAGNOSIS — Z79.899 HISTORY OF ONGOING TREATMENT WITH HIGH-RISK MEDICATION: Primary | ICD-10-CM
